# Patient Record
Sex: FEMALE | Race: WHITE | Employment: FULL TIME | URBAN - METROPOLITAN AREA
[De-identification: names, ages, dates, MRNs, and addresses within clinical notes are randomized per-mention and may not be internally consistent; named-entity substitution may affect disease eponyms.]

---

## 2018-12-29 ENCOUNTER — TRANSFERRED RECORDS (OUTPATIENT)
Dept: HEALTH INFORMATION MANAGEMENT | Facility: CLINIC | Age: 38
End: 2018-12-29

## 2019-07-05 ENCOUNTER — TRANSFERRED RECORDS (OUTPATIENT)
Dept: HEALTH INFORMATION MANAGEMENT | Facility: CLINIC | Age: 39
End: 2019-07-05

## 2019-11-08 ENCOUNTER — OFFICE VISIT (OUTPATIENT)
Dept: PULMONOLOGY | Facility: CLINIC | Age: 39
End: 2019-11-08
Payer: COMMERCIAL

## 2019-11-08 VITALS
DIASTOLIC BLOOD PRESSURE: 74 MMHG | WEIGHT: 227.5 LBS | BODY MASS INDEX: 38.84 KG/M2 | SYSTOLIC BLOOD PRESSURE: 110 MMHG | HEIGHT: 64 IN | RESPIRATION RATE: 15 BRPM | HEART RATE: 64 BPM | OXYGEN SATURATION: 96 %

## 2019-11-08 DIAGNOSIS — Z23 NEED FOR PROPHYLACTIC VACCINATION AND INOCULATION AGAINST INFLUENZA: ICD-10-CM

## 2019-11-08 DIAGNOSIS — R05.9 COUGH: Primary | ICD-10-CM

## 2019-11-08 DIAGNOSIS — J98.4 RESTRICTIVE LUNG DISEASE: ICD-10-CM

## 2019-11-08 LAB
BASOPHILS # BLD AUTO: 0 10E9/L (ref 0–0.2)
BASOPHILS NFR BLD AUTO: 0.3 %
DIFFERENTIAL METHOD BLD: NORMAL
EOSINOPHIL NFR BLD AUTO: 2.2 %
ERYTHROCYTE [DISTWIDTH] IN BLOOD BY AUTOMATED COUNT: 13.4 % (ref 10–15)
FEF 25/75: NORMAL
FEV-1: NORMAL
FEV1/FVC: NORMAL
FVC: NORMAL
HCT VFR BLD AUTO: 44.1 % (ref 35–47)
HGB BLD-MCNC: 14.7 G/DL (ref 11.7–15.7)
IMM GRANULOCYTES # BLD: 0 10E9/L (ref 0–0.4)
IMM GRANULOCYTES NFR BLD: 0.2 %
LYMPHOCYTES # BLD AUTO: 2.7 10E9/L (ref 0.8–5.3)
LYMPHOCYTES NFR BLD AUTO: 26.9 %
MCH RBC QN AUTO: 29.1 PG (ref 26.5–33)
MCHC RBC AUTO-ENTMCNC: 33.3 G/DL (ref 31.5–36.5)
MCV RBC AUTO: 87 FL (ref 78–100)
MONOCYTES # BLD AUTO: 0.7 10E9/L (ref 0–1.3)
MONOCYTES NFR BLD AUTO: 7.1 %
NEUTROPHILS # BLD AUTO: 6.3 10E9/L (ref 1.6–8.3)
NEUTROPHILS NFR BLD AUTO: 63.3 %
NRBC # BLD AUTO: 0 10*3/UL
NRBC BLD AUTO-RTO: 0 /100
PLATELET # BLD AUTO: 271 10E9/L (ref 150–450)
RBC # BLD AUTO: 5.05 10E12/L (ref 3.8–5.2)
WBC # BLD AUTO: 10 10E9/L (ref 4–11)

## 2019-11-08 PROCEDURE — 85025 COMPLETE CBC W/AUTO DIFF WBC: CPT

## 2019-11-08 PROCEDURE — 94010 BREATHING CAPACITY TEST: CPT

## 2019-11-08 PROCEDURE — 82785 ASSAY OF IGE: CPT

## 2019-11-08 PROCEDURE — 90686 IIV4 VACC NO PRSV 0.5 ML IM: CPT

## 2019-11-08 PROCEDURE — 99204 OFFICE O/P NEW MOD 45 MIN: CPT | Mod: 25

## 2019-11-08 PROCEDURE — 90471 IMMUNIZATION ADMIN: CPT

## 2019-11-08 PROCEDURE — 36415 COLL VENOUS BLD VENIPUNCTURE: CPT

## 2019-11-08 RX ORDER — MULTIPLE VITAMINS W/ MINERALS TAB 9MG-400MCG
1 TAB ORAL DAILY
COMMUNITY

## 2019-11-08 ASSESSMENT — MIFFLIN-ST. JEOR: SCORE: 1696.93

## 2019-11-08 NOTE — NURSING NOTE
Does Sally have home oxygen?  No     Weight management plan: Patient was referred to their PCP to discuss a diet and exercise plan.

## 2019-11-08 NOTE — PROGRESS NOTES
[facility]  Pulmonary Medicine  Visit Clinic Note  November 8, 2019         ASSESSMENT & PLAN       Chronic bronchitis: She would fit the diagnosis clinically of chronic bronchitis with her repeated episodes of cough and sputum production.  Pertinent parts of her story are that these are episodic symptoms, and there seems to be some geographic propensity to develop them.  Meaning that while she is in [place] she has symptoms, but when she is in tropical weather she does not.  This raises the possibility atopy.  Certainly viral URIs with bronchitis is possible, but she is getting these episodes quite frequently.  Additionally she has been told she has lymphadenopathy in her neck when these happen which would support an infectious cause.  Next    On spirometry, she has looks to be restrictive physiology with a low FEV1 and forced vital capacity.  This raises the possibility of an interstitial lung disease.  Bronchiectasis could also be possible.    I will plan on checking an IgE and CBC with eosinophils.  IgG subclasses may be something to consider in the future.  She does have some insurance concerns, but if her insurance will cover a chest CT scan I think this would be helpful to identify any evidence of bronchiectasis or interstitial lung disease.  Pulmonary function testing with lung volumes and diffusion capacity could happen down the road, but at this point I do not think it will help diagnostically.  I offered to put her on a daily proton pump inhibitor out of the off-chance that silent reflux could be causing cough.  She declined this which I think is okay.    I will give her a flu vaccine today.    I will call her with the results of her blood work.  She will let me know about the chest CT scan and whether insurance covers that or not.      Silvano Connors MD          Today's visit note:     Chief Complaint: Sally Mcneil is a 39 year old year old female who is being seen for  Consult      HISTORY OF PRESENT ILLNESS:    This is a 39-year-old female with a history of idiopathic intracranial hypertension, who presents the pulmonary clinic today for evaluation of cough and shortness of breath.    She says that ever since she has been a kid she has struggled with episodes of bronchitis.  However over the years it has increased in frequency.  Over the last 2 years, she has had episodes almost every 2 to 3 months.  They always start the same and and somewhat the same.  It starts with sinus congestion and sore throat, and later travels to her chest.  It feels like an elephant is sitting on her chest.  She cannot clear her throat and developed significant shortness of breath.  Eventually she go to the doctor and get some medications for Bronchitis such as antibiotics or steroids.  Oftentimes she needs a second round of antibiotics.   The cough will linger and then finally get better.  This whole episode will generally last about 4 to 6 weeks.     Interestingly she spends a lot of time on cruise ships because she is a .  Whenever she is on the cruise ship she feels wonderful.  When she comes back to [place] she starts to develop the symptoms.  She used to be on inhalers when she was younger, but they did not help.  She smoked from age [age] about 1 pack/day.  Her symptoms did get better after quitting.  She is tried taking Zyrtec daily, but this did not help out with her symptoms.             Past Medical and Surgical History:     Past Medical History:   Diagnosis Date     Intracranial hypertension      Neck pain      Past Surgical History:   Procedure Laterality Date     C EACH ADD TOOTH EXTRACTION       TUBAL LIGATION             Family History:     Family History   Problem Relation Age of Onset     Lung Cancer Maternal Grandfather               Social History:     Social History     Socioeconomic History     Marital status:      Spouse name: Not on file      "Number of children: Not on file     Years of education: Not on file     Highest education level: Not on file   Occupational History     Not on file   Social Needs     Financial resource strain: Not on file     Food insecurity:     Worry: Not on file     Inability: Not on file     Transportation needs:     Medical: Not on file     Non-medical: Not on file   Tobacco Use     Smoking status: Former Smoker     Packs/day: 1.00     Years: 7.00     Pack years: 7.00     Last attempt to quit: 2001     Years since quittin.8     Smokeless tobacco: Never Used   Substance and Sexual Activity     Alcohol use: Not on file     Drug use: Not on file     Sexual activity: Not on file   Lifestyle     Physical activity:     Days per week: Not on file     Minutes per session: Not on file     Stress: Not on file   Relationships     Social connections:     Talks on phone: Not on file     Gets together: Not on file     Attends [employer] service: Not on file     Active member of club or organization: Not on file     Attends meetings of clubs or organizations: Not on file     Relationship status: Not on file     Intimate partner violence:     Fear of current or ex partner: Not on file     Emotionally abused: Not on file     Physically abused: Not on file     Forced sexual activity: Not on file   Other Topics Concern     Not on file   Social History Narrative                 Medications:     Current Outpatient Medications   Medication     multivitamin w/minerals (MULTI-VITAMIN) tablet     No current facility-administered medications for this visit.             Review of Systems:       A complete review of systems was otherwise negative except as noted in the HPI.      PHYSICAL EXAM:  /74   Pulse 64   Resp 15   Ht 1.626 m (5' 4\")   Wt 103.2 kg (227 lb 8 oz)   SpO2 96%   BMI 39.05 kg/m       General: Well developed, well nourished, No apparent distress  Eyes: Anicteric  Nose: Nasal mucosa with no " edema or hyperemia.  No polyps  Ears: Hearing grossly normal   Mouth: Oral mucosa is moist, without any lesions. No oropharyngeal exudate.  Neck: supple, no thyromegaly  Lymphatics: No cervical or supraclavicular nodes  Respiratory: Good air movement. No crackles. No rhonchi. No wheezes  Cardiac: RRR, normal S1, S2. No murmurs.   Abdomen: Soft, NT/ND  Musculoskeletal: Extremities normal. No clubbing. No cyanosis. No edema.  Skin: No rash on limited exam  Neuro: Normal mentation. Normal speech.  Psych:Normal affect           Data:   All laboratory and imaging data reviewed.      HIV negative.    PFT:   FEV1/FVC ratio 0.79.  FVC is 3.07 L which is 83% predicted, and the FEV1 is 2.43 L which is 79% predicted.    PFT Interpretation:  No airflow obstruction.  Low forced vital capacity and forced expiratory volume in 1 second.  Valid Maneuver    CXR: Chest x-ray from May 2018 images are not available to me.  The reports that is a normal chest x-ray.      Recent Results (from the past 168 hour(s))   Spirometry, Breathing Capacity: Normal Order, Clinic Performed    Collection Time: 11/08/19 12:00 AM   Result Value Ref Range    FEV-1      FVC      FEV1/FVC      FEF 25/75

## 2019-11-12 LAB — IGE SERPL-ACNC: 17 KIU/L (ref 0–114)

## 2019-11-13 ENCOUNTER — TELEPHONE (OUTPATIENT)
Dept: PULMONOLOGY | Facility: CLINIC | Age: 39
End: 2019-11-13

## 2019-11-13 NOTE — TELEPHONE ENCOUNTER
Writer called patient to inform her of the message below. Patient asking if Dr. Connors will call her with the CT results (scheduled 11/18/19) or if she should schedule appt. I told her I would reach out to Dory and someone would get back to her.    Can you let Sally know her blood work was normal.  I will wait for her chest CT scan result.     cm Gore RN on 11/13/2019 at 3:17 PM

## 2019-11-13 NOTE — TELEPHONE ENCOUNTER
Writer called patient back informing her that Dr. Connors will call her with the results.  Cassie Gore RN on 11/13/2019 at 4:13 PM

## 2019-11-18 ENCOUNTER — HOSPITAL ENCOUNTER (OUTPATIENT)
Dept: CT IMAGING | Facility: CLINIC | Age: 39
Discharge: HOME | End: 2019-11-18
Payer: COMMERCIAL

## 2019-11-18 DIAGNOSIS — R05.9 COUGH: ICD-10-CM

## 2019-11-18 DIAGNOSIS — J98.4 RESTRICTIVE LUNG DISEASE: ICD-10-CM

## 2019-11-18 PROCEDURE — 71250 CT THORAX DX C-: CPT

## 2019-11-20 ENCOUNTER — TELEPHONE (OUTPATIENT)
Dept: PULMONOLOGY | Facility: CLINIC | Age: 39
End: 2019-11-20

## 2019-11-20 NOTE — TELEPHONE ENCOUNTER
I called Sally to go over testing.     Blood work is normal.     Chest CT shows a small amount of atelectasis at the base of the lung.  No clear cause of cough.      She is going to monitor for now.  One thing that we could do in the future would be empiric treatment (asthma or GERD).  Another thing would be testing for asthma with a methacholine challenge test.     Silvano Connors MD